# Patient Record
Sex: MALE | Race: WHITE | Employment: FULL TIME | URBAN - METROPOLITAN AREA
[De-identification: names, ages, dates, MRNs, and addresses within clinical notes are randomized per-mention and may not be internally consistent; named-entity substitution may affect disease eponyms.]

---

## 2018-08-16 ENCOUNTER — HOSPITAL ENCOUNTER (OUTPATIENT)
Dept: PHYSICAL THERAPY | Age: 33
Discharge: HOME | End: 2018-08-16
Payer: COMMERCIAL

## 2018-08-16 PROCEDURE — 97110 THERAPEUTIC EXERCISES: CPT

## 2018-08-16 PROCEDURE — 97162 PT EVAL MOD COMPLEX 30 MIN: CPT

## 2018-08-16 NOTE — PROGRESS NOTES
Ambulatory/Rehab Services H2 Model Falls Risk Assessment    Risk Factor Pts. ·   Confusion/Disorientation/Impulsivity  []    4 ·   Symptomatic Depression  []   2 ·   Altered Elimination  []   1 ·   Dizziness/Vertigo  []   1 ·   Gender (Male)  [x]   1 ·   Any administered antiepileptics (anticonvulsants):  []   2 ·   Any administered benzodiazepines:  []   1 ·   Visual Impairment (specify):  []   1 ·   Portable Oxygen Use  []   1 ·   Orthostatic ? BP  []   1 ·   History of Recent Falls (within 3 mos.)  []   5     Ability to Rise from Chair (choose one) Pts. ·   Ability to rise in a single movement  [x]   0 ·   Pushes up, successful in one attempt  []   1 ·   Multiple attempts, but successful  []   3 ·   Unable to rise without assistance  []   4   Total: (5 or greater = High Risk) 1     Falls Prevention Plan:   []                Physical Limitations to Exercise (specify):   []                Mobility Assistance Device (type):   []                Exercise/Equipment Adaptation (specify):    ©2010 [facility] [address] Patent #[id number].  Federal Law prohibits the replication, distribution or use without written permission from [facility] [email or web address]

## 2018-08-16 NOTE — THERAPY EVALUATION
Janey Goodpasture  : 1985 [address] [address].  [phone]   [phone]        OUTPATIENT PHYSICAL THERAPY:Initial Assessment 2018         ICD-10: Treatment Diagnosis: Pain in left knee (M25.562)  Precautions/Allergies:   Review of patient's allergies indicates not on file. Fall Risk Score: 1 (? 5 = High Risk)  MD Orders: evaluate and treat MEDICAL/REFERRING DIAGNOSIS:  Left knee pain [M25.562]   DATE OF ONSET: 1 year history  REFERRING PHYSICIAN: Simon Holcomb MD  RETURN PHYSICIAN APPOINTMENT: 18     INITIAL ASSESSMENT:  Mr. Madi Parks presents to therapy with left knee pain that is classified as patellofemoral dysfunction with excessive global compression. 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 He has impairments in flexibility of the quadriceps and hamstring musculature leading to increased compressive forces at the patella. He has weakness through the lateral hip musculature and quadriceps leading to poor frontal and transverse plane control of the thigh. Skilled therapy is required to return to prior level of function. PROBLEM LIST (Impacting functional limitations):  1. Decreased Strength  2. Decreased ADL/Functional Activities  3. Decreased Ambulation Ability/Technique  4. Decreased Balance  5. Increased Pain  6. Decreased Activity Tolerance  7. Decreased Flexibility/Joint Mobility INTERVENTIONS PLANNED:  1. Balance Exercise  2. Family Education  3. Gait Training  4. Home Exercise Program (HEP)  5. Manual Therapy  6. Neuromuscular Re-education/Strengthening  7. Range of Motion (ROM)  8. Therapeutic Activites  9. Therapeutic Exercise/Strengthening  10. modalities    TREATMENT PLAN:  Effective Dates: 2018 TO 2018 (90 days).  Frequency/Duration: 2 times a week for 90 Days  GOALS: (Goals have been discussed and agreed upon with patient.)  Short-Term Functional Goals: Time Frame: 2 weeks  1. Patient will be independent with HEP. 2. Patient will report pain <2/10 with daily activity. Discharge Goals: Time Frame: 4 weeks  1. Patient will demonstrate symmetric quadriceps flexibility to decrease compressive loading of the patella. 2. Patient will report no pain over a 1 week period. 3. Patient will demonstrate 5/5 strength with hip abduction and single leg squat to improve knee stability and decrease pain. Rehabilitation Potential For Stated Goals: Excellent  Regarding Kathy Raman Brittney's therapy, I certify that the treatment plan above will be carried out by a therapist or under their direction. Thank you for this referral,  Hilda Birmingham DPT       Referring Physician Signature: Jazmine Ojeda MD              Date                      HISTORY:   History of Present Injury/Illness (Reason for Referral):  Patient presents to therapy with primary complaint of left knee pain. Symptoms have been present over a 1 duration with no specific onset of inciting injury. Symptoms are located at the medial patella femoral joint and medial femoral condyle. Symptoms occur with prolonged sitting activities, such as when driving. This causes increased pain with initially standing from sitting and he gets occasional sharp pain and feels like it wants to give way. 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 He works as a  and also part time as a  with job duties requiring kneeling in crawl spaces. He also notes some intermittent stiffness in the morning. Symptoms do not affect his daily activity, but have become increasingly aggravating. He does not use medication for management. Past Medical History/Comorbidities:   Mr. Felix Mensah  has no past medical history on file. Mr. Felix Mensah  has no past surgical history on file.   Social History/Living Environment:     Patient lives at home with his family and 2 young children. Prior Level of Function/Work/Activity:  Fully independent. Patient works as a  and . Dominant Side:         RIGHT  Current Medications:  No current outpatient prescriptions on file. Date Last Reviewed:  8/16/2018   # of Personal Factors/Comorbidities that affect the Plan of Care: 1-2: MODERATE COMPLEXITY   EXAMINATION:   Observation/Orthostatic Postural Assessment:          No swelling or atrophy noted. Palpation:          Tender along medial PFJ and medial femoral condyle  ROM:     SLR: 60 ° L; 70 ° R  Prone quad length limited 20 ° relative to R  Hip rotation: WNL  Ankle mobility is full        Strength:     Knee extension: 4+/5 L; 5/5 R  SLR: 4+/5 L; 5/5 R  Hip abduction: 4/5 L; 5/5 R  Hip ER: 5/5 L; 5/5 R  Single leg squat: 4/5 with mild pain L; 5/5 R      Special Tests: Edenilson and Thessaly (-)  Neurological Screen:        Sensation and reflexes are fully intact. Functional Mobility:         Independent  Balance:          Maintains single leg stance for >10 seconds   Body Structures Involved:  1. Nerves  2. Bones  3. Joints  4. Muscles  5. Ligaments Body Functions Affected:  1. Sensory/Pain  2. Neuromusculoskeletal  3. Movement Related Activities and Participation Affected:  1. Learning and Applying Knowledge  2. General Tasks and Demands  3. Mobility  4. Self Care  5. Domestic Life  6. Interpersonal Interactions and Relationships  7. Community, Social and [place]   # of elements that affect the Plan of Care: 3: MODERATE COMPLEXITY   CLINICAL PRESENTATION:   Presentation: Evolving clinical presentation with changing clinical characteristics: MODERATE COMPLEXITY   CLINICAL DECISION MAKING:   Outcome Measure:    Tool Used: Lower Extremity Functional Scale (LEFS)  Score:  Initial: 73/80 Most Recent: X/80 (Date: -- )   Interpretation of Score: 20 questions each scored on a 5 point scale with 0 representing \"extreme difficulty or unable to perform\" and 4 representing \"no difficulty\". The lower the score, the greater the functional disability. 80/80 represents no disability. Minimal detectable change is 9 points. Score 80 79-63 62-48 47-32 31-16 15-1 0   Modifier CH CI CJ CK CL CM CN       Medical Necessity:   · Patient is expected to demonstrate progress in strength, range of motion, balance and coordination to increase independence with community mobility and return to prior level of function. Reason for Services/Other Comments:  · Patient has demonstrated an improvement in functional level by independent performance of HEP. Use of outcome tool(s) and clinical judgement create a POC that gives a: Questionable prediction of patient's progress: MODERATE COMPLEXITY   TREATMENT:   (In addition to Assessment/Re-Assessment sessions the following treatments were rendered)  THERAPEUTIC EXERCISE: (see below for minutes):  Exercises per grid below to improve mobility, strength, balance and coordination. Required minimal verbal and manual cues to promote proper body alignment, promote proper body posture and promote proper body mechanics. Progressed resistance, range, repetitions and complexity of movement as indicated. MANUAL THERAPY: (see below for minutes): Joint mobilization and Soft tissue mobilization was utilized and necessary because of the patient's restricted joint motion, painful spasm, loss of articular motion and restricted motion of soft tissue. MODALITIES: (see below for minutes):       To decrease pain     Date: 08/16/18       Modalities:                                Therapeutic Exercise: 15 min       Stretching AIS quad stretch [id number], hamstring stretch with heel on wall x1 min       Bridge Single leg 3x10       Hip abduction Sidelying 3x10                               Proprioceptive Activities:                                Manual Therapy:                        Therapeutic Activities:                                        HEP: see 08/16/18 flow sheet for specifics  [employer] Portal  Treatment/Session Assessment:  Patient is independent with performance of above described home program.    · Pain/ Symptoms: Initial:   8/10 Post Session:  No increase following today's treatment session. ·   Compliance with Program/Exercises: Will assess as treatment progresses. · Recommendations/Intent for next treatment session: \"Next visit will focus on advancements to more challenging activities\".   Total Treatment Duration:  PT Patient Time In/Time Out  Time In: 0800  Time Out: [address] [facility]

## 2018-08-23 ENCOUNTER — HOSPITAL ENCOUNTER (OUTPATIENT)
Dept: PHYSICAL THERAPY | Age: 33
Discharge: HOME | End: 2018-08-23
Payer: COMMERCIAL

## 2018-08-23 PROCEDURE — 97110 THERAPEUTIC EXERCISES: CPT

## 2018-08-23 NOTE — PROGRESS NOTES
Jeannie Garcia  : 1985 [address] [address].  [phone]   [phone]        OUTPATIENT PHYSICAL THERAPY:Daily Note 2018         ICD-10: Treatment Diagnosis: Pain in left knee (M25.562)  Precautions/Allergies:   Review of patient's allergies indicates not on file. Fall Risk Score: 1 (? 5 = High Risk)  MD Orders: evaluate and treat MEDICAL/REFERRING DIAGNOSIS:  Left knee pain [M25.562]   DATE OF ONSET: 1 year history  REFERRING PHYSICIAN: Allen Lake MD  RETURN PHYSICIAN APPOINTMENT: 18     INITIAL ASSESSMENT:  Mr. Mike Garcia presents to therapy with left knee pain that is classified as patellofemoral dysfunction with excessive global compression. 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 He has impairments in flexibility of the quadriceps and hamstring musculature leading to increased compressive forces at the patella. He has weakness through the lateral hip musculature and quadriceps leading to poor frontal and transverse plane control of the thigh. Skilled therapy is required to return to prior level of function. PROBLEM LIST (Impacting functional limitations):  1. Decreased Strength  2. Decreased ADL/Functional Activities  3. Decreased Ambulation Ability/Technique  4. Decreased Balance  5. Increased Pain  6. Decreased Activity Tolerance  7. Decreased Flexibility/Joint Mobility INTERVENTIONS PLANNED:  1. Balance Exercise  2. Family Education  3. Gait Training  4. Home Exercise Program (HEP)  5. Manual Therapy  6. Neuromuscular Re-education/Strengthening  7. Range of Motion (ROM)  8. Therapeutic Activites  9. Therapeutic Exercise/Strengthening  10. modalities    TREATMENT PLAN:  Effective Dates: 2018 TO 2018 (90 days).  Frequency/Duration: 2 times a week for 90 Days  GOALS: (Goals have been discussed and agreed upon with patient.)  Short-Term Functional Goals: Time Frame: 2 weeks  1. Patient will be independent with HEP. 2. Patient will report pain <2/10 with daily activity. Discharge Goals: Time Frame: 4 weeks  1. Patient will demonstrate symmetric quadriceps flexibility to decrease compressive loading of the patella. 2. Patient will report no pain over a 1 week period. 3. Patient will demonstrate 5/5 strength with hip abduction and single leg squat to improve knee stability and decrease pain. Rehabilitation Potential For Stated Goals: Excellent                HISTORY:   History of Present Injury/Illness (Reason for Referral):  Patient presents to therapy with primary complaint of left knee pain. Symptoms have been present over a 1 duration with no specific onset of inciting injury. Symptoms are located at the medial patella femoral joint and medial femoral condyle. Symptoms occur with prolonged sitting activities, such as when driving. This causes increased pain with initially standing from sitting and he gets occasional sharp pain and feels like it wants to give way. 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 He works as a  and also part time as a  with job duties requiring kneeling in crawl spaces. He also notes some intermittent stiffness in the morning. Symptoms do not affect his daily activity, but have become increasingly aggravating. He does not use medication for management. Past Medical History/Comorbidities:   Mr. Jasmyne Varner  has no past medical history on file. Mr. Jasmyne Varner  has no past surgical history on file. Social History/Living Environment:     Patient lives at home with his family and 2 young children. Prior Level of Function/Work/Activity:  Fully independent. Patient works as a  and . Dominant Side:         RIGHT  Current Medications:  No current outpatient prescriptions on file.    Date Last Reviewed:  8/23/2018   EXAMINATION: Observation/Orthostatic Postural Assessment:          No swelling or atrophy noted. Palpation:          Tender along medial PFJ and medial femoral condyle  ROM:     SLR: 60 ° L; 70 ° R  Prone quad length limited 20 ° relative to R  Hip rotation: WNL  Ankle mobility is full        Strength:     Knee extension: 4+/5 L; 5/5 R  SLR: 4+/5 L; 5/5 R  Hip abduction: 4/5 L; 5/5 R  Hip ER: 5/5 L; 5/5 R  Single leg squat: 4/5 with mild pain L; 5/5 R      Special Tests: Edenilson and Thessaly (-)  Neurological Screen:        Sensation and reflexes are fully intact. Functional Mobility:         Independent  Balance:          Maintains single leg stance for >10 seconds   Body Structures Involved:  1. Nerves  2. Bones  3. Joints  4. Muscles  5. Ligaments Body Functions Affected:  1. Sensory/Pain  2. Neuromusculoskeletal  3. Movement Related Activities and Participation Affected:  1. Learning and Applying Knowledge  2. General Tasks and Demands  3. Mobility  4. Self Care  5. Domestic Life  6. Interpersonal Interactions and Relationships  7. Community, Social and Civic Life   CLINICAL PRESENTATION:   CLINICAL DECISION MAKING:   Outcome Measure: Tool Used: Lower Extremity Functional Scale (LEFS)  Score:  Initial: 73/80 Most Recent: X/80 (Date: -- )   Interpretation of Score: 20 questions each scored on a 5 point scale with 0 representing \"extreme difficulty or unable to perform\" and 4 representing \"no difficulty\". The lower the score, the greater the functional disability. 80/80 represents no disability. Minimal detectable change is 9 points. Score 80 79-63 62-48 47-32 31-16 15-1 0   Modifier CH CI CJ CK CL CM CN       Medical Necessity:   · Patient is expected to demonstrate progress in strength, range of motion, balance and coordination to increase independence with community mobility and return to prior level of function.   Reason for Services/Other Comments:  · Patient has demonstrated an improvement in functional level by independent performance of HEP. TREATMENT:   (In addition to Assessment/Re-Assessment sessions the following treatments were rendered)  THERAPEUTIC EXERCISE: (see below for minutes):  Exercises per grid below to improve mobility, strength, balance and coordination. Required minimal verbal and manual cues to promote proper body alignment, promote proper body posture and promote proper body mechanics. Progressed resistance, range, repetitions and complexity of movement as indicated. MANUAL THERAPY: (see below for minutes): Joint mobilization and Soft tissue mobilization was utilized and necessary because of the patient's restricted joint motion, painful spasm, loss of articular motion and restricted motion of soft tissue. MODALITIES: (see below for minutes): To decrease pain     Date: 08/16/18 8/23/18 (visit 2)      Modalities:                                Therapeutic Exercise: 15 min 30 min      Stretching AIS quad stretch [id number], hamstring stretch with heel on wall x1 min Hamstring 30 sec hold x 2      Bridge Single leg 3x10 Single leg bilateral 2x10      SLR  2x10      Prone hip extension  2x10      Hip abduction Sidelying 3x10 Side lying 2x10 L LE      Bike   5 min      Heel raises  3x10      Slant board stretch  1 min hold      Lateral walk   2 laps blue      Eccentric heel taps  2x10, 4 iin      Proprioceptive Activities:                                Manual Therapy:                        Therapeutic Activities:                                        HEP: see 08/16/18 flow sheet for specifics  [employer] Portal  Treatment/Session Assessment:  Continue POC. Pt was issued another HEP. HE did not report any pain with exercise. Pt was 15 min late due to work. · Pain/ Symptoms: Initial:   0/10 prior to tx, L medial knee    Pt states \"I stood for several hours and it really hurt my knee. \" Post Session:  No pain ·   Compliance with Program/Exercises:  Will assess as treatment progresses. · Recommendations/Intent for next treatment session: \"Next visit will focus on advancements to more challenging activities\".   Total Treatment Duration:  PT Patient Time In/Time Out  Time In: 0815  Time Out: [facility]

## 2018-08-28 ENCOUNTER — HOSPITAL ENCOUNTER (OUTPATIENT)
Dept: PHYSICAL THERAPY | Age: 33
Discharge: HOME | End: 2018-08-28
Payer: COMMERCIAL

## 2018-08-28 PROCEDURE — 97110 THERAPEUTIC EXERCISES: CPT

## 2018-08-28 NOTE — PROGRESS NOTES
Duncan Wanye  : 1985 [address] [facility],[address]  [address] [place] [address].  [phone]   [phone]        OUTPATIENT PHYSICAL THERAPY:Daily Note 2018         ICD-10: Treatment Diagnosis: Pain in left knee (M25.562)  Precautions/Allergies:   Review of patient's allergies indicates not on file. Fall Risk Score: 1 (? 5 = High Risk)  MD Orders: evaluate and treat MEDICAL/REFERRING DIAGNOSIS:  Left knee pain [M25.562]   DATE OF ONSET: 1 year history  REFERRING PHYSICIAN: Carolyn Crawford MD  RETURN PHYSICIAN APPOINTMENT: 18     INITIAL ASSESSMENT:  Mr. Susanne Nagy presents to therapy with left knee pain that is classified as patellofemoral dysfunction with excessive global compression. 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 He has impairments in flexibility of the quadriceps and hamstring musculature leading to increased compressive forces at the patella. He has weakness through the lateral hip musculature and quadriceps leading to poor frontal and transverse plane control of the thigh. Skilled therapy is required to return to prior level of function. PROBLEM LIST (Impacting functional limitations):  1. Decreased Strength  2. Decreased ADL/Functional Activities  3. Decreased Ambulation Ability/Technique  4. Decreased Balance  5. Increased Pain  6. Decreased Activity Tolerance  7. Decreased Flexibility/Joint Mobility INTERVENTIONS PLANNED:  1. Balance Exercise  2. Family Education  3. Gait Training  4. Home Exercise Program (HEP)  5. Manual Therapy  6. Neuromuscular Re-education/Strengthening  7. Range of Motion (ROM)  8. Therapeutic Activites  9. Therapeutic Exercise/Strengthening  10. modalities    TREATMENT PLAN:  Effective Dates: 2018 TO 2018 (90 days).  Frequency/Duration: 2 times a week for 90 Days  GOALS: (Goals have been discussed and agreed upon with patient.)  Short-Term Functional Goals: Time Frame: 2 weeks  1. Patient will be independent with HEP. 2. Patient will report pain <2/10 with daily activity. Discharge Goals: Time Frame: 4 weeks  1. Patient will demonstrate symmetric quadriceps flexibility to decrease compressive loading of the patella. 2. Patient will report no pain over a 1 week period. 3. Patient will demonstrate 5/5 strength with hip abduction and single leg squat to improve knee stability and decrease pain. Rehabilitation Potential For Stated Goals: Excellent                HISTORY:   History of Present Injury/Illness (Reason for Referral):  Patient presents to therapy with primary complaint of left knee pain. Symptoms have been present over a 1 duration with no specific onset of inciting injury. Symptoms are located at the medial patella femoral joint and medial femoral condyle. Symptoms occur with prolonged sitting activities, such as when driving. This causes increased pain with initially standing from sitting and he gets occasional sharp pain and feels like it wants to give way. 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 He works as a  and also part time as a  with job duties requiring kneeling in crawl spaces. He also notes some intermittent stiffness in the morning. Symptoms do not affect his daily activity, but have become increasingly aggravating. He does not use medication for management. Past Medical History/Comorbidities:   Mr. Oscar Mirza  has no past medical history on file. Mr. Oscar Mirza  has no past surgical history on file. Social History/Living Environment:     Patient lives at home with his family and 2 young children. Prior Level of Function/Work/Activity:  Fully independent. Patient works as a  and . Dominant Side:         RIGHT  Current Medications:  No current outpatient prescriptions on file.    Date Last Reviewed:  8/28/2018   EXAMINATION: Observation/Orthostatic Postural Assessment:          No swelling or atrophy noted. Palpation:          Tender along medial PFJ and medial femoral condyle  ROM:     SLR: 60 ° L; 70 ° R  Prone quad length limited 20 ° relative to R  Hip rotation: WNL  Ankle mobility is full        Strength:     Knee extension: 4+/5 L; 5/5 R  SLR: 4+/5 L; 5/5 R  Hip abduction: 4/5 L; 5/5 R  Hip ER: 5/5 L; 5/5 R  Single leg squat: 4/5 with mild pain L; 5/5 R      Special Tests: Edenilson and Thessaly (-)  Neurological Screen:        Sensation and reflexes are fully intact. Functional Mobility:         Independent  Balance:          Maintains single leg stance for >10 seconds   Body Structures Involved:  1. Nerves  2. Bones  3. Joints  4. Muscles  5. Ligaments Body Functions Affected:  1. Sensory/Pain  2. Neuromusculoskeletal  3. Movement Related Activities and Participation Affected:  1. Learning and Applying Knowledge  2. General Tasks and Demands  3. Mobility  4. Self Care  5. Domestic Life  6. Interpersonal Interactions and Relationships  7. Community, Social and Civic Life   CLINICAL PRESENTATION:   CLINICAL DECISION MAKING:   Outcome Measure: Tool Used: Lower Extremity Functional Scale (LEFS)  Score:  Initial: 73/80 Most Recent: X/80 (Date: -- )   Interpretation of Score: 20 questions each scored on a 5 point scale with 0 representing \"extreme difficulty or unable to perform\" and 4 representing \"no difficulty\". The lower the score, the greater the functional disability. 80/80 represents no disability. Minimal detectable change is 9 points. Score 80 79-63 62-48 47-32 31-16 15-1 0   Modifier CH CI CJ CK CL CM CN       Medical Necessity:   · Patient is expected to demonstrate progress in strength, range of motion, balance and coordination to increase independence with community mobility and return to prior level of function.   Reason for Services/Other Comments:  · Patient has demonstrated an improvement in functional level by independent performance of HEP. TREATMENT:   (In addition to Assessment/Re-Assessment sessions the following treatments were rendered)  THERAPEUTIC EXERCISE: (see below for minutes):  Exercises per grid below to improve mobility, strength, balance and coordination. Required minimal verbal and manual cues to promote proper body alignment, promote proper body posture and promote proper body mechanics. Progressed resistance, range, repetitions and complexity of movement as indicated. MANUAL THERAPY: (see below for minutes): Joint mobilization and Soft tissue mobilization was utilized and necessary because of the patient's restricted joint motion, painful spasm, loss of articular motion and restricted motion of soft tissue. MODALITIES: (see below for minutes): To decrease pain     Date: 08/16/18 8/23/18 (visit 2) 8/28/18  (visit 3)     Modalities:                                Therapeutic Exercise: 15 min 30 min   45mins     Stretching AIS quad stretch [id number], hamstring stretch with heel on wall x1 min Hamstring 30 sec hold x 2      Bridge Single leg 3x10 Single leg bilateral 2x10 Single LE 2x15 ea LE      SLR  2x10 2.5#   2x10 B     Prone hip extension  2x10 x15 B     Hip abduction Sidelying 3x10 Side lying 2x10 L LE Side lying 2.5# 2x10 B     Bike   5 min 6 min     Heel raises  3x10 3-way x10 ea     Slant board stretch  1 min hold 1 min     Lateral walk   2 laps blue 3 laps blue loop     Monster walk   2 laps blue loop     LAQ with isometric HSC contralaterally   2x10 B             Eccentric heel taps  2x10, 4 iin 4\" 2x10 L     Proprioceptive Activities:                                Manual Therapy:                        Therapeutic Activities:                                        HEP: see 08/16/18 flow sheet for specifics  [facility]  Treatment/Session Assessment:  Continue POC. Pt continued with LE strengthening per flow sheet above.   He notes occasional episodes of crepitus along medial edge of L patella. Pt was able to complete all TherEx without painful episodes. · Pain/ Symptoms: Initial:   0/10 prior to tx, L medial knee    Pt notes prolonged standing aggrevates L knee. Pt says is able to perform work duties without complaint, however, says sometimes is a little sore the next day. Post Session:  No pain ·   Compliance with Program/Exercises: Will assess as treatment progresses. · Recommendations/Intent for next treatment session: \"Next visit will focus on advancements to more challenging activities\".   Total Treatment Duration:  PT Patient Time In/Time Out  Time In: 1615  Time Out: Tariq Barajas 85, PTA

## 2018-08-31 ENCOUNTER — HOSPITAL ENCOUNTER (OUTPATIENT)
Dept: PHYSICAL THERAPY | Age: 33
Discharge: HOME | End: 2018-08-31
Payer: COMMERCIAL

## 2018-08-31 PROCEDURE — 97110 THERAPEUTIC EXERCISES: CPT

## 2018-08-31 NOTE — PROGRESS NOTES
Patti Bence  : 1985 [address]  [address] [address].  [phone]   [phone]        OUTPATIENT PHYSICAL THERAPY:Daily Note 2018         ICD-10: Treatment Diagnosis: Pain in left knee (M25.562)  Precautions/Allergies:   Review of patient's allergies indicates not on file. Fall Risk Score: 1 (? 5 = High Risk)  MD Orders: evaluate and treat MEDICAL/REFERRING DIAGNOSIS:  Left knee pain [M25.562]   DATE OF ONSET: 1 year history  REFERRING PHYSICIAN: Liberty Lane MD  RETURN PHYSICIAN APPOINTMENT: 18     INITIAL ASSESSMENT:  Mr. Makenna Kaminski presents to therapy with left knee pain that is classified as patellofemoral dysfunction with excessive global compression. 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 He has impairments in flexibility of the quadriceps and hamstring musculature leading to increased compressive forces at the patella. He has weakness through the lateral hip musculature and quadriceps leading to poor frontal and transverse plane control of the thigh. Skilled therapy is required to return to prior level of function. PROBLEM LIST (Impacting functional limitations):  1. Decreased Strength  2. Decreased ADL/Functional Activities  3. Decreased Ambulation Ability/Technique  4. Decreased Balance  5. Increased Pain  6. Decreased Activity Tolerance  7. Decreased Flexibility/Joint Mobility INTERVENTIONS PLANNED:  1. Balance Exercise  2. Family Education  3. Gait Training  4. Home Exercise Program (HEP)  5. Manual Therapy  6. Neuromuscular Re-education/Strengthening  7. Range of Motion (ROM)  8. Therapeutic Activites  9. Therapeutic Exercise/Strengthening  10. modalities    TREATMENT PLAN:  Effective Dates: 2018 TO 2018 (90 days).  Frequency/Duration: 2 times a week for 90 Days  GOALS: (Goals have been discussed and agreed upon with patient.)  Short-Term Functional Goals: Time Frame: 2 weeks  1. Patient will be independent with HEP. 2. Patient will report pain <2/10 with daily activity. Discharge Goals: Time Frame: 4 weeks  1. Patient will demonstrate symmetric quadriceps flexibility to decrease compressive loading of the patella. 2. Patient will report no pain over a 1 week period. 3. Patient will demonstrate 5/5 strength with hip abduction and single leg squat to improve knee stability and decrease pain. Rehabilitation Potential For Stated Goals: Excellent                HISTORY:   History of Present Injury/Illness (Reason for Referral):  Patient presents to therapy with primary complaint of left knee pain. Symptoms have been present over a 1 duration with no specific onset of inciting injury. Symptoms are located at the medial patella femoral joint and medial femoral condyle. Symptoms occur with prolonged sitting activities, such as when driving. This causes increased pain with initially standing from sitting and he gets occasional sharp pain and feels like it wants to give way. 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 He works as a  and also part time as a  with job duties requiring kneeling in crawl spaces. He also notes some intermittent stiffness in the morning. Symptoms do not affect his daily activity, but have become increasingly aggravating. He does not use medication for management. Past Medical History/Comorbidities:   Mr. Monica Ordonez  has no past medical history on file. Mr. Monica Ordonez  has no past surgical history on file. Social History/Living Environment:     Patient lives at home with his family and 2 young children. Prior Level of Function/Work/Activity:  Fully independent. Patient works as a  and . Dominant Side:         RIGHT  Current Medications:  No current outpatient prescriptions on file.    Date Last Reviewed:  8/31/2018   EXAMINATION: Observation/Orthostatic Postural Assessment:          No swelling or atrophy noted. Palpation:          Tender along medial PFJ and medial femoral condyle  ROM:     SLR: 60 ° L; 70 ° R  Prone quad length limited 20 ° relative to R  Hip rotation: WNL  Ankle mobility is full        Strength:     Knee extension: 4+/5 L; 5/5 R  SLR: 4+/5 L; 5/5 R  Hip abduction: 4/5 L; 5/5 R  Hip ER: 5/5 L; 5/5 R  Single leg squat: 4/5 with mild pain L; 5/5 R      Special Tests: Edenilson and Thessaly (-)  Neurological Screen:        Sensation and reflexes are fully intact. Functional Mobility:         Independent  Balance:          Maintains single leg stance for >10 seconds   Body Structures Involved:  1. Nerves  2. Bones  3. Joints  4. Muscles  5. Ligaments Body Functions Affected:  1. Sensory/Pain  2. Neuromusculoskeletal  3. Movement Related Activities and Participation Affected:  1. Learning and Applying Knowledge  2. General Tasks and Demands  3. Mobility  4. Self Care  5. Domestic Life  6. Interpersonal Interactions and Relationships  7. Community, Social and Civic Life   CLINICAL PRESENTATION:   CLINICAL DECISION MAKING:   Outcome Measure: Tool Used: Lower Extremity Functional Scale (LEFS)  Score:  Initial: 73/80 Most Recent: X/80 (Date: -- )   Interpretation of Score: 20 questions each scored on a 5 point scale with 0 representing \"extreme difficulty or unable to perform\" and 4 representing \"no difficulty\". The lower the score, the greater the functional disability. 80/80 represents no disability. Minimal detectable change is 9 points. Score 80 79-63 62-48 47-32 31-16 15-1 0   Modifier CH CI CJ CK CL CM CN       Medical Necessity:   · Patient is expected to demonstrate progress in strength, range of motion, balance and coordination to increase independence with community mobility and return to prior level of function.   Reason for Services/Other Comments:  · Patient has demonstrated an improvement in functional level by independent performance of HEP. TREATMENT:   (In addition to Assessment/Re-Assessment sessions the following treatments were rendered)  THERAPEUTIC EXERCISE: (see below for minutes):  Exercises per grid below to improve mobility, strength, balance and coordination. Required minimal verbal and manual cues to promote proper body alignment, promote proper body posture and promote proper body mechanics. Progressed resistance, range, repetitions and complexity of movement as indicated. MANUAL THERAPY: (see below for minutes): Joint mobilization and Soft tissue mobilization was utilized and necessary because of the patient's restricted joint motion, painful spasm, loss of articular motion and restricted motion of soft tissue. MODALITIES: (see below for minutes):       To decrease pain     Date: 08/16/18 8/23/18 (visit 2) 8/28/18  (visit 3) 8/31/18 (visit 4)    Modalities:                                Therapeutic Exercise: 15 min 30 min   45mins 40 min    Stretching AIS quad stretch [id number], hamstring stretch with heel on wall x1 min Hamstring 30 sec hold x 2  Hamstring and ITB with strap, 30 sec hold x 4 each BLE    Bridge Single leg 3x10 Single leg bilateral 2x10 Single LE 2x15 ea LE      SLR  2x10 2.5#   2x10 B     Prone hip extension  2x10 x15 B     Hip abduction Sidelying 3x10 Side lying 2x10 L LE Side lying 2.5# 2x10 B     Bike   5 min 6 min 5 min    Heel raises  3x10 3-way x10 ea x30 from floor    Slant board stretch  1 min hold 1 min 1 min hold    Lateral walk   2 laps blue 3 laps blue loop 2 laps black with knees flexed    Monster walk   2 laps blue loop     LAQ with isometric HSC contralaterally   2x10 B             Eccentric heel taps  2x10, 4 iin 4\" 2x10 L 3x10 L 6 in    Proprioceptive Activities:                                Manual Therapy:                        Therapeutic Activities:        Lateral step ups    6 in 3x10 L LE    Forward step ups with high knee    3x10, 10 in L LE Reverse lunges with slider    3x10            HEP: see 08/16/18 flow sheet for specifics  [employer] Portal  Treatment/Session Assessment: Pt did not report increased pain with exercise and was advised to ice at home. Pt notes that lateral movements and twisting are pain in the medial L knee. Continue POC. · Pain/ Symptoms: Initial:   0/10 prior to tx, L medial knee    Pt states \"The past 2 days the knee has been stiff and painful. \"    Pt had to leave early for a family emergency. Post Session:  No pain ·   Compliance with Program/Exercises: Will assess as treatment progresses. · Recommendations/Intent for next treatment session: \"Next visit will focus on advancements to more challenging activities\".   Total Treatment Duration:  PT Patient Time In/Time Out  Time In: 0800  Time Out: [address] [place], [place]

## 2018-09-07 ENCOUNTER — HOSPITAL ENCOUNTER (OUTPATIENT)
Dept: PHYSICAL THERAPY | Age: 33
Discharge: HOME | End: 2018-09-07
Payer: COMMERCIAL

## 2018-09-07 PROCEDURE — 97110 THERAPEUTIC EXERCISES: CPT

## 2018-09-07 NOTE — PROGRESS NOTES
Renaldo Saab  : 1985 [address] [facility],[address] [place] [address].  [phone]   [phone]        OUTPATIENT PHYSICAL THERAPY:Daily Note and Discontinuation Summary 2018         ICD-10: Treatment Diagnosis: Pain in left knee (M25.562)  Precautions/Allergies:   Review of patient's allergies indicates not on file. Fall Risk Score: 1 (? 5 = High Risk)  MD Orders: evaluate and treat MEDICAL/REFERRING DIAGNOSIS:  Left knee pain [M25.562]   DATE OF ONSET: 1 year history  REFERRING PHYSICIAN: David Corado MD  RETURN PHYSICIAN APPOINTMENT: 18     INITIAL ASSESSMENT:  Mr. Willem Underwood presents to therapy with left knee pain that is classified as patellofemoral dysfunction with excessive global compression. 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 He has impairments in flexibility of the quadriceps and hamstring musculature leading to increased compressive forces at the patella. He has weakness through the lateral hip musculature and quadriceps leading to poor frontal and transverse plane control of the thigh. Skilled therapy is required to return to prior level of function. PROBLEM LIST (Impacting functional limitations):  1. Decreased Strength  2. Decreased ADL/Functional Activities  3. Decreased Ambulation Ability/Technique  4. Decreased Balance  5. Increased Pain  6. Decreased Activity Tolerance  7. Decreased Flexibility/Joint Mobility INTERVENTIONS PLANNED:  1. Balance Exercise  2. Family Education  3. Gait Training  4. Home Exercise Program (HEP)  5. Manual Therapy  6. Neuromuscular Re-education/Strengthening  7. Range of Motion (ROM)  8. Therapeutic Activites  9. Therapeutic Exercise/Strengthening  10. modalities    TREATMENT PLAN:  Effective Dates: 2018 TO 2018 (90 days).  Frequency/Duration: 2 times a week for 90 Days  GOALS: (Goals have been discussed and agreed upon with patient.)  Short-Term Functional Goals: Time Frame: 2 weeks  1. Patient will be independent with HEP. 2. Patient will report pain <2/10 with daily activity. Discharge Goals: Time Frame: 4 weeks  1. Patient will demonstrate symmetric quadriceps flexibility to decrease compressive loading of the patella. 2. Patient will report no pain over a 1 week period. 3. Patient will demonstrate 5/5 strength with hip abduction and single leg squat to improve knee stability and decrease pain. Rehabilitation Potential For Stated Goals: Excellent                HISTORY:   History of Present Injury/Illness (Reason for Referral):  Patient presents to therapy with primary complaint of left knee pain. Symptoms have been present over a 1 duration with no specific onset of inciting injury. Symptoms are located at the medial patella femoral joint and medial femoral condyle. Symptoms occur with prolonged sitting activities, such as when driving. This causes increased pain with initially standing from sitting and he gets occasional sharp pain and feels like it wants to give way. 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 He works as a  and also part time as a  with job duties requiring kneeling in crawl spaces. He also notes some intermittent stiffness in the morning. Symptoms do not affect his daily activity, but have become increasingly aggravating. He does not use medication for management. Past Medical History/Comorbidities:   Mr. PRANAV Stark  has no past medical history on file. Mr. PRANAV Stark  has no past surgical history on file. Social History/Living Environment:     Patient lives at home with his family and 2 young children. Prior Level of Function/Work/Activity:  Fully independent. Patient works as a  and . Dominant Side:         RIGHT  Current Medications:  No current outpatient prescriptions on file.    Date Last Reviewed: 9/7/2018   EXAMINATION:   Observation/Orthostatic Postural Assessment:          No swelling or atrophy noted. Palpation:          Tender along medial PFJ and medial femoral condyle  ROM:     SLR: 60 ° L; 70 ° R  Prone quad length limited 20 ° relative to R  Hip rotation: WNL  Ankle mobility is full        Strength:     Knee extension: 4+/5 L; 5/5 R  SLR: 4+/5 L; 5/5 R  Hip abduction: 4/5 L; 5/5 R  Hip ER: 5/5 L; 5/5 R  Single leg squat: 4/5 with mild pain L; 5/5 R      Special Tests: Edenilson and Thessaly (-)  Neurological Screen:        Sensation and reflexes are fully intact. Functional Mobility:         Independent  Balance:          Maintains single leg stance for >10 seconds   Body Structures Involved:  1. Nerves  2. Bones  3. Joints  4. Muscles  5. Ligaments Body Functions Affected:  1. Sensory/Pain  2. Neuromusculoskeletal  3. Movement Related Activities and Participation Affected:  1. Learning and Applying Knowledge  2. General Tasks and Demands  3. Mobility  4. Self Care  5. Domestic Life  6. Interpersonal Interactions and Relationships  7. Community, Social and Civic Life   CLINICAL PRESENTATION:   CLINICAL DECISION MAKING:   Outcome Measure: Tool Used: Lower Extremity Functional Scale (LEFS)  Score:  Initial: 73/80 Most Recent: X/80 (Date: -- )   Interpretation of Score: 20 questions each scored on a 5 point scale with 0 representing \"extreme difficulty or unable to perform\" and 4 representing \"no difficulty\". The lower the score, the greater the functional disability. 80/80 represents no disability. Minimal detectable change is 9 points. Score 80 79-63 62-48 47-32 31-16 15-1 0   Modifier CH CI CJ CK CL CM CN       Medical Necessity:   · Patient is expected to demonstrate progress in strength, range of motion, balance and coordination to increase independence with community mobility and return to prior level of function.   Reason for Services/Other Comments:  · Patient has demonstrated an improvement in functional level by independent performance of HEP. TREATMENT:   (In addition to Assessment/Re-Assessment sessions the following treatments were rendered)  THERAPEUTIC EXERCISE: (see below for minutes):  Exercises per grid below to improve mobility, strength, balance and coordination. Required minimal verbal and manual cues to promote proper body alignment, promote proper body posture and promote proper body mechanics. Progressed resistance, range, repetitions and complexity of movement as indicated. MANUAL THERAPY: (see below for minutes): Joint mobilization and Soft tissue mobilization was utilized and necessary because of the patient's restricted joint motion, painful spasm, loss of articular motion and restricted motion of soft tissue. MODALITIES: (see below for minutes):       To decrease pain     Date: 08/16/18 8/23/18 (visit 2) 8/28/18  (visit 3) 8/31/18 (visit 4) 9/7/18 (visit 5)   Modalities:                                Therapeutic Exercise: 15 min 30 min   45mins 40 min 42 min   Stretching AIS quad stretch [id number], hamstring stretch with heel on wall x1 min Hamstring 30 sec hold x 2  Hamstring and ITB with strap, 30 sec hold x 4 each BLE Hamstrings 30 SH x4 B   clamshells     x30 black B LE   Bridge Single leg 3x10 Single leg bilateral 2x10 Single LE 2x15 ea LE   With hamstring SB curls 2x10   SLR  2x10 2.5#   2x10 B  Toe out and toe forward 3x10 each with 2.5#   Prone hip extension  2x10 x15 B     Hip abduction Sidelying 3x10 Side lying 2x10 L LE Side lying 2.5# 2x10 B  3x10, 2.5# cuff   Bike   5 min 6 min 5 min 5 min    Heel raises  3x10 3-way x10 ea x30 from floor    Slant board stretch  1 min hold 1 min 1 min hold    Lateral walk   2 laps blue 3 laps blue loop 2 laps black with knees flexed 1  Lap knees flexed and 1 laps knees extended, silver   Monster walk   2 laps blue loop     LAQ with isometric HSC contralaterally   2x10 B             Eccentric heel taps  2x10, 4 iin 4\" 2x10 L 3x10 L 6 in 3x10 L 4 in   Proprioceptive Activities:                                Manual Therapy:                        Therapeutic Activities:        Lateral step ups    6 in 3x10 L LE    Forward step ups with high knee    3x10, 10 in L LE    Reverse lunges with slider    3x10 3x10 ea LE   Leg press     x10 100#  x10 120#  x10 140#   HEP: see 08/16/18 flow sheet for specifics  [facility]  Treatment/Session Assessment: Pt reported mild pain with reverse lunges. Pt is concerned about his visit limit for therapy and he may wait until he sees the MD until he schedules again. Continue POC. 11/15/18 update: Patient cancelled his last 2 scheduled appointments and did not return for further therapy. · Pain/ Symptoms: Initial:   1-2/10 prior to tx, L medial knee    Pt states \"The knee is about the same. I haven't notice a lot of improvement. \"     Post Session:  No increase ·   Compliance with Program/Exercises: Will assess as treatment progresses. · Recommendations/Intent for next treatment session: \"Next visit will focus on advancements to more challenging activities\".   Total Treatment Duration:  PT Patient Time In/Time Out  Time In: 0805  Time Out: [address], PTA

## 2018-09-18 ENCOUNTER — HOSPITAL ENCOUNTER (OUTPATIENT)
Dept: PHYSICAL THERAPY | Age: 33
Discharge: HOME | End: 2018-09-18
Payer: COMMERCIAL

## 2018-09-21 ENCOUNTER — HOSPITAL ENCOUNTER (OUTPATIENT)
Dept: PHYSICAL THERAPY | Age: 33
Discharge: HOME | End: 2018-09-21
Payer: COMMERCIAL